# Patient Record
Sex: FEMALE | Race: BLACK OR AFRICAN AMERICAN | NOT HISPANIC OR LATINO | Employment: FULL TIME | URBAN - METROPOLITAN AREA
[De-identification: names, ages, dates, MRNs, and addresses within clinical notes are randomized per-mention and may not be internally consistent; named-entity substitution may affect disease eponyms.]

---

## 2017-02-14 ENCOUNTER — HISTORICAL (OUTPATIENT)
Dept: LAB | Facility: HOSPITAL | Age: 37
End: 2017-02-14

## 2017-04-18 ENCOUNTER — HISTORICAL (OUTPATIENT)
Dept: LAB | Facility: HOSPITAL | Age: 37
End: 2017-04-18

## 2017-06-12 ENCOUNTER — HISTORICAL (OUTPATIENT)
Dept: LAB | Facility: HOSPITAL | Age: 37
End: 2017-06-12

## 2017-06-12 LAB
T3FREE SERPL-MCNC: 5.15 PG/ML (ref 2.18–3.98)
T4 FREE SERPL-MCNC: 1.76 NG/DL (ref 0.76–1.46)
TSH SERPL-ACNC: <0.007 MIU/ML (ref 0.36–3.74)

## 2019-08-06 DIAGNOSIS — O09.522 ELDERLY MULTIGRAVIDA IN SECOND TRIMESTER: Primary | ICD-10-CM

## 2019-08-12 ENCOUNTER — OFFICE VISIT (OUTPATIENT)
Dept: MATERNAL FETAL MEDICINE | Facility: CLINIC | Age: 39
End: 2019-08-12
Payer: MEDICAID

## 2019-08-12 VITALS
BODY MASS INDEX: 33.33 KG/M2 | SYSTOLIC BLOOD PRESSURE: 110 MMHG | RESPIRATION RATE: 20 BRPM | DIASTOLIC BLOOD PRESSURE: 72 MMHG | WEIGHT: 225 LBS | HEART RATE: 96 BPM | HEIGHT: 69 IN

## 2019-08-12 DIAGNOSIS — O09.522 ELDERLY MULTIGRAVIDA IN SECOND TRIMESTER: ICD-10-CM

## 2019-08-12 PROCEDURE — 99214 OFFICE O/P EST MOD 30 MIN: CPT | Mod: TH,25,S$GLB, | Performed by: OBSTETRICS & GYNECOLOGY

## 2019-08-12 PROCEDURE — 76811 OB US DETAILED SNGL FETUS: CPT | Mod: S$GLB,,, | Performed by: OBSTETRICS & GYNECOLOGY

## 2019-08-12 PROCEDURE — 76811 PR US, OB FETAL EVAL & EXAM, TRANSABDOM,FIRST GESTATION: ICD-10-PCS | Mod: S$GLB,,, | Performed by: OBSTETRICS & GYNECOLOGY

## 2019-08-12 PROCEDURE — 99214 PR OFFICE/OUTPT VISIT, EST, LEVL IV, 30-39 MIN: ICD-10-PCS | Mod: TH,25,S$GLB, | Performed by: OBSTETRICS & GYNECOLOGY

## 2019-08-12 RX ORDER — ONDANSETRON 8 MG/1
TABLET, ORALLY DISINTEGRATING ORAL
Refills: 0 | COMMUNITY
Start: 2019-06-12 | End: 2019-09-23

## 2019-08-12 RX ORDER — MIRTAZAPINE 15 MG/1
TABLET, ORALLY DISINTEGRATING ORAL
Refills: 1 | COMMUNITY
Start: 2019-07-08 | End: 2019-09-23

## 2019-08-12 RX ORDER — NAPROXEN SODIUM 220 MG/1
81 TABLET, FILM COATED ORAL DAILY
COMMUNITY

## 2019-08-12 NOTE — PROGRESS NOTES
"Alexandra is here for initial MFM consultation, referred by Dr. Forrester for AMA with negative MaterniT 21 test.    She is feeling fetal movement.    Alexandra denies vaginal bleeding, loss of fluid, recurrent contractions.    Vitals:    08/12/19 0944   BP: 110/72   Pulse: 96   Resp: 20   Weight: 102.1 kg (225 lb)   Height: 5' 9" (1.753 m)             "

## 2019-08-12 NOTE — PROGRESS NOTES
Indication for consultation:  AMA    Provider requesting consultation: Dr. Forrester    Dear Dr. Forrester    I had the pleasure of seeing Alexandra Rushing for initial consultation today.  As you recall she is a 38 y.o.  at 20w4d here for consultation regarding AMA.  Of note the patient has completed free fetal DNA screening that notes a low risk of trisomy 13, 18 and 21.    PMH:  The patient has a history hyperthyroidism.  According the patient she stopped her medications in 2019 at the direction of her primary care physician.  She underwent thyroid screening in  as part of her routine obstetrical panel that noted a suppressed TSH at 0.03 uIU/ml yet her free T4 was within normal limitsn at 1.53g/dL.  Her pulse rate on our examination today is 96 beats per minute.  She does not have any overt clinical evidence of hyperthyroidism on my evaluation today.    Patient also has a known history of sickle cell trait.  The father baby has not been tested.    Ob Hx:  This is the patient's 4th pregnancy. Her 1st pregnancy was delivered in  at a gestational age of 36 weeks.  She had pre term rupture membranes at that time that led to a vaginal delivery of a 6 lb 9 oz female .  Her 2nd pregnancy was a term vaginal delivery in  of a 6 lb 12 oz female .  The patient denies any complications with that pregnancy. Her last pregnancy was also a term vaginal delivery at 37 weeks in  of a 5 lb 12 oz male .  There were no complications with that pregnancy. She denies ever requiring progesterone injections for the prevention of recurrent  birth.    PSH:  Cholecystectomy in .  Hernia repair yet she is unclear what year this occurred.    SOC:  She denies any tobacco, alcohol or recreational drug use    Medications:  Low-dose aspirin each day.    Family hx:  She denies any family history of congenital anomalies.  She denies any family history of genetic abnormalities. She has a  "father has diabetes and hypertension.  Her mother  of a heart attack at the age of 46.  Her mother had diabetes.    Allergies:  She is allergic to tramadol which causes itching    Review of systems: The patient denies any vaginal bleeding, loss of fluid or contraction pain today.  Vitals:    19 0944   BP: 110/72   Pulse: 96   Resp: 20   Weight: 225#  Height: 5' 9"      Physical exam:  Gen: WD obese BF in NAD  HEENT: WNL  Abdomen: Soft, non-tender  Skin: No rash or jaundice  Extremities: No clubbing or cyanosis  Neuro: Grossly intact    Ultrasound:  A detailed fetal anatomical survey was attempted today.  There is a single intrauterine pregnancy in the breech presentation.  Structurally there does not appear to be any evidence of abnormality however was not able to clear the use of the fetal spine secondary to the fetus lying on its back.  Fortunately the intracranial structures argue against any evidence of open neural tube defect.  The brain and heart appear normal. The placenta is anterior with no evidence of previa.  The amniotic fluid volume appears normal.  The cervix appears to be of normal length.  Please see official report for further specifics.    Recommendations:  Today I reviewed with the patient the increased risk of fetal aneuploidy with advanced maternal age.  I discussed with her the limitations of ultrasound in the diagnosis of fetal aneuploidy.  I reviewed with her the results of her free fetal DNA screening along with the sensitivity in the negative predictive value of this test.  Lastly I discussed with her the diagnostic option of amniocentesis yet I did not recommend this procedure secondary to today's ultrasound appearing normal and her free fetal DNA screening noting low risk of aneuploidy.  I would like for the patient to return here again in 4-6 weeks to complete the anatomical survey.    In regards to her history of thyroid dysfunction, her laboratory assessment in  appears " to be in line with the changes of thyroid in pregnancy.  I definitely do not see any indication for medication for an overactive thyroid based on those results and today's exam.  I would recommend in your next office visti to recheck her TSH and free T4 levels.  I would only treat her hyperthyroidism if her free T4 is elevated.  I would not treat her as hyperthyroid with just a suppressed TSH (sublcinical hyperthyroid) unless she is overtly symptomatic, which she is not today.    One thing I did not discuss with the patient today as she was late to her visit is that her 1st pregnancy was considered pre term.  She does have the option of progesterone injections each week beginning immediately and continuing until 37 weeks.  This is something you may want to discuss with her yet I suspect she will declined since she has had 2 term vaginal deliveries with her last pregnancies.    From a followup standpoint I would like for the patient to return here again in 1 month to complete the fetal anatomical survey.  If upon return her thyroid functions are within normal limits and the fetal anatomy scan is normal, then I would not see any reason for further maternal fetal medicine management.    Thanks once again for allowing us to participate in the care of your patients.  If you have any questions about today's consultation feel free to contact me or my partners at [phone].    Sincerely,

## 2019-08-12 NOTE — LETTER
August 12, 2019        Emir Forrester MD  [address]  [address]  [facility] [address]             [facility]  [address]  [place] [address]  Phone: [phone]  Fax: [phone]   Patient: Alexandra Rushing   MR Number: [id number]   YOB: 1980   Date of Visit: 8/12/2019       Dear Dr. Forrester:    Thank you for referring Alexandra Rushing to me for evaluation. Attached you will find relevant portions of my assessment and plan of care.    If you have questions, please do not hesitate to call me. I look forward to following Alexandra Rushing along with you.    Sincerely,      Leonard Gooden MD            CC  No Recipients    Enclosure

## 2019-09-17 DIAGNOSIS — O09.522 ELDERLY MULTIGRAVIDA IN SECOND TRIMESTER: Primary | ICD-10-CM

## 2019-09-23 ENCOUNTER — PROCEDURE VISIT (OUTPATIENT)
Dept: MATERNAL FETAL MEDICINE | Facility: CLINIC | Age: 39
End: 2019-09-23
Payer: MEDICAID

## 2019-09-23 VITALS
RESPIRATION RATE: 20 BRPM | SYSTOLIC BLOOD PRESSURE: 122 MMHG | DIASTOLIC BLOOD PRESSURE: 72 MMHG | WEIGHT: 230 LBS | BODY MASS INDEX: 33.97 KG/M2 | HEART RATE: 86 BPM

## 2019-09-23 DIAGNOSIS — O09.522 ELDERLY MULTIGRAVIDA IN SECOND TRIMESTER: ICD-10-CM

## 2019-09-23 PROCEDURE — 99213 OFFICE O/P EST LOW 20 MIN: CPT | Mod: TH,25,S$GLB, | Performed by: OBSTETRICS & GYNECOLOGY

## 2019-09-23 PROCEDURE — 76816 PR  US,PREGNANT UTERUS,F/U,TRANSABD APP: ICD-10-PCS | Mod: S$GLB,,, | Performed by: OBSTETRICS & GYNECOLOGY

## 2019-09-23 PROCEDURE — 76816 OB US FOLLOW-UP PER FETUS: CPT | Mod: S$GLB,,, | Performed by: OBSTETRICS & GYNECOLOGY

## 2019-09-23 PROCEDURE — 99213 PR OFFICE/OUTPT VISIT, EST, LEVL III, 20-29 MIN: ICD-10-PCS | Mod: TH,25,S$GLB, | Performed by: OBSTETRICS & GYNECOLOGY

## 2019-09-23 NOTE — PROGRESS NOTES
Alexandra is here for followup [facility] consultation for history of 36wk  delivery with PROM, and AMA with low risk NIPT, referred by Dr. Forrester.    She is feeling fetal movement.    Alexandra denies vaginal bleeding, loss of fluid, recurrent contractions.    Vitals:    19 0915   BP: 122/72   Pulse: 86   Resp: 20   Weight: 104.3 kg (230 lb)

## 2019-09-23 NOTE — PROGRESS NOTES
Indication for follow up consultation:  1. Advanced maternal age with normal cell free DNA    Provider requesting consultation:  Emir Forrester MD    Dear Gene,    I had the pleasure of seeing Alexandra Rushing for follow up consultation today.  As you recall she is a 39 y.o.  at 26w4d here for follow up consultation regarding her advanced maternal age.  As you know she underwent cell free DNA testing that returned negative. Her last ultrasound here was reassuring but incomplete regarding some of the anatomy.  Today she is feeling well.  She reports normal fetal movement and denies any vaginal bleeding, leakage of fluid, or cramping.    Review of systems: The patient denies any vaginal bleeding, loss of fluid or contraction pain today.  Vitals:    19 0915   BP: 122/72   Pulse: 86   Resp: 20   Weight: 104.3 kg (230 lb)       Physical exam:  Gen: WDWN in NAD  HEENT: WNL  Abdomen: Soft, non-tender  Skin: No rash or jaundice  Extremities: No clubbing or cyanosis  Neuro: Grossly intact    Ultrasound:  A repeat detailed fetal anatomical survey was completed once again today.  Please see attached report for full details.  In summary we demonstrated a nascimento gestation in cephalic presentation.  Amniotic fluid volume and heart rate were normal.  The baby is growing around the 65th percentile or 2 lb 7 oz.  No structural malformations were seen.    Assessment:  1. Intrauterine pregnancy at 26w4d  2. AMA    Recommendations:   1. We got excellent views of the fetal anatomy today and see nothing worrisome.  Baby is growing appropriately.  We did not have to see her back here again unless complications arise and you require our assistance.      We greatly appreciate you allowing us to assist in her care.  Please call with any questions or concerns.    Sincerely,    Booker Taylor Jr., M.D.  Maternal Fetal Medicine

## 2019-09-23 NOTE — LETTER
September 23, 2019        Emir Forrester MD  [address]  [address]  [facility] [address]             [facility]  [address]  [place] [address]  Phone: [phone]  Fax: [phone]   Patient: Alexandra Rushing   MR Number: [id number]   YOB: 1980   Date of Visit: 9/23/2019       Dear Dr. Forrester:    Thank you for referring Alexandra Rushing to me for evaluation. Below are the relevant portions of my assessment and plan of care.       If you have questions, please do not hesitate to call me. I look forward to following Alexandra along with you.    Sincerely,      Booker Taylor MD         CC  No Recipients

## 2020-07-06 ENCOUNTER — TELEPHONE (OUTPATIENT)
Dept: OBSTETRICS AND GYNECOLOGY | Facility: CLINIC | Age: 40
End: 2020-07-06

## 2020-07-06 NOTE — TELEPHONE ENCOUNTER
----- Message from Cally Dimas sent at 7/6/2020 10:53 AM CDT -----  Regarding: Test results  Patient need to speak to nurse regarding blood work results. Call back number [phone].

## 2020-07-06 NOTE — TELEPHONE ENCOUNTER
Phone message returned, pt requesting to know if she has antibodies for covid 19 so that she can donate it to a covid 19 pt, pt notified that no lab results are noted. Pt stated she had the blood work done at her job, and will review it tomorrow for results.

## 2023-06-20 DIAGNOSIS — N83.209 OVARIAN CYST: Primary | ICD-10-CM

## 2025-02-20 DIAGNOSIS — N39.3 STRESS INCONTINENCE: Primary | ICD-10-CM
